# Patient Record
Sex: MALE | Race: BLACK OR AFRICAN AMERICAN | NOT HISPANIC OR LATINO | Employment: OTHER | URBAN - METROPOLITAN AREA
[De-identification: names, ages, dates, MRNs, and addresses within clinical notes are randomized per-mention and may not be internally consistent; named-entity substitution may affect disease eponyms.]

---

## 2024-04-04 ENCOUNTER — OFFICE VISIT (OUTPATIENT)
Dept: UROLOGY | Facility: CLINIC | Age: 69
End: 2024-04-04
Payer: MEDICARE

## 2024-04-04 ENCOUNTER — LAB VISIT (OUTPATIENT)
Dept: LAB | Facility: HOSPITAL | Age: 69
End: 2024-04-04
Attending: UROLOGY
Payer: MEDICARE

## 2024-04-04 VITALS
DIASTOLIC BLOOD PRESSURE: 77 MMHG | SYSTOLIC BLOOD PRESSURE: 144 MMHG | BODY MASS INDEX: 37.09 KG/M2 | RESPIRATION RATE: 18 BRPM | HEIGHT: 72 IN | HEART RATE: 60 BPM | WEIGHT: 273.81 LBS | TEMPERATURE: 98 F

## 2024-04-04 DIAGNOSIS — N13.8 ENLARGED PROSTATE WITH URINARY OBSTRUCTION: ICD-10-CM

## 2024-04-04 DIAGNOSIS — N40.1 ENLARGED PROSTATE WITH URINARY OBSTRUCTION: ICD-10-CM

## 2024-04-04 DIAGNOSIS — R97.20 ELEVATED PSA: Primary | ICD-10-CM

## 2024-04-04 DIAGNOSIS — R97.20 ELEVATED PSA: ICD-10-CM

## 2024-04-04 LAB
PROSTATE SPECIFIC ANTIGEN, TOTAL: 4.5 NG/ML (ref 0–4)
PSA FREE MFR SERPL: 20.89 %
PSA FREE SERPL-MCNC: 0.94 NG/ML (ref 0–1.5)

## 2024-04-04 PROCEDURE — 84154 ASSAY OF PSA FREE: CPT | Performed by: UROLOGY

## 2024-04-04 PROCEDURE — 99204 OFFICE O/P NEW MOD 45 MIN: CPT | Mod: PBBFAC,PN | Performed by: UROLOGY

## 2024-04-04 PROCEDURE — 36415 COLL VENOUS BLD VENIPUNCTURE: CPT | Mod: PN | Performed by: UROLOGY

## 2024-04-04 PROCEDURE — 99999 PR PBB SHADOW E&M-NEW PATIENT-LVL IV: CPT | Mod: PBBFAC,,, | Performed by: UROLOGY

## 2024-04-04 PROCEDURE — 99214 OFFICE O/P EST MOD 30 MIN: CPT | Mod: S$PBB,,, | Performed by: UROLOGY

## 2024-04-04 RX ORDER — LEVOTHYROXINE SODIUM 88 UG/1
88 TABLET ORAL
COMMUNITY
Start: 2024-04-02 | End: 2025-04-02

## 2024-04-04 RX ORDER — METFORMIN HYDROCHLORIDE 500 MG/1
500 TABLET, EXTENDED RELEASE ORAL
COMMUNITY
Start: 2024-01-22

## 2024-04-04 RX ORDER — SIMVASTATIN 20 MG/1
20 TABLET, FILM COATED ORAL
COMMUNITY

## 2024-04-04 RX ORDER — AMLODIPINE BESYLATE 10 MG/1
10 TABLET ORAL
COMMUNITY

## 2024-04-04 RX ORDER — METOPROLOL TARTRATE 50 MG/1
50 TABLET ORAL
COMMUNITY

## 2024-04-04 NOTE — PROGRESS NOTES
Subjective:       Patient ID: Deric Hdez is a 68 y.o. male.    Chief Complaint: Follow-up      History of Present Illness:     Mr Hdez has an elevated PSA that we have been monitoring.  He has never had a prostate biopsy.  He had a prostate MRI on 4-10-23 that did not show evidence of prostate cancer.  Moderate to normal urinary flow.  He feels that he is emptying his bladder.  Nocturia X 1.  No gross hematuria.  He says he is no longer taking tadalafil 5 mg as he is not sexually active due to his wife having a dixon catheter.    Follow-up  Pertinent negatives include no chest pain, chills, fever, nausea, rash or vomiting.        History reviewed. No pertinent past medical history.  History reviewed. No pertinent family history.  Social History     Socioeconomic History    Marital status:    Tobacco Use    Smoking status: Never    Smokeless tobacco: Never   Substance and Sexual Activity    Alcohol use: Never    Drug use: Never    Sexual activity: Not Currently     Partners: Female     Outpatient Encounter Medications as of 4/4/2024   Medication Sig Dispense Refill    amLODIPine (NORVASC) 10 MG tablet Take 10 mg by mouth.      levothyroxine (SYNTHROID) 88 MCG tablet Take 88 mcg by mouth.      LYCOPENE ORAL Take one every day  Indications: Vitamins      metFORMIN (GLUCOPHAGE-XR) 500 MG ER 24hr tablet Take 500 mg by mouth.      metoprolol tartrate (LOPRESSOR) 50 MG tablet Take 50 mg by mouth.      psyllium (METAMUCIL) powder Dissolve and drink as needed as directed  Indications: Appetite Suppression      simvastatin (ZOCOR) 20 MG tablet Take 20 mg by mouth.       No facility-administered encounter medications on file as of 4/4/2024.        Review of Systems   Constitutional:  Negative for chills and fever.   Respiratory:  Negative for shortness of breath.    Cardiovascular:  Negative for chest pain.   Gastrointestinal:  Negative for nausea and vomiting.   Genitourinary:  Negative for hematuria.    Musculoskeletal:  Negative for back pain.   Skin:  Negative for rash.   Neurological:  Negative for dizziness.   Psychiatric/Behavioral:  Negative for agitation.        Objective:     BP (!) 144/77   Pulse 60   Temp 98 °F (36.7 °C) (Oral)   Resp 18   Ht 6' (1.829 m)   Wt 124.2 kg (273 lb 13 oz)   BMI 37.14 kg/m²     Physical Exam  Constitutional:       Appearance: Normal appearance.   Pulmonary:      Effort: Pulmonary effort is normal.   Abdominal:      Palpations: Abdomen is soft.   Genitourinary:     Comments: Deferred  Neurological:      Mental Status: He is alert and oriented to person, place, and time.   Psychiatric:         Mood and Affect: Mood normal.         No visits with results within 1 Day(s) from this visit.   Latest known visit with results is:   No results found for any previous visit.        No results found for this or any previous visit (from the past 8760 hour(s)).         8-15-23  PSA 4.97.  Free % PSA 17.1    5-18-23  PSA 5.68.  Free % PSA 18.3%.    3-6-23  PSA 5.25.  Free % PSA 21.1%.    7-10-23  Urine ExoDx 38.67.    I reviewed all of the above lab results.         4-10-23  Prostate MRI.  BRG.  See report.  No suspicious prostate lesions identified by MRI.  BPH.  PIRADS 2.  Prostate size is 75.5 cc.  There is no lymphadenopathy.    8-5-21  Prostate MRI.  OLOL.  See report.  No evidence of clinically significant tumor.  PIRADS 2.  Prostate size is 72 cc.    I reviewed all of the above imaging results.       Assessment:       1. Elevated PSA    2. Enlarged prostate with urinary obstruction      Plan:     Orders Placed This Encounter    PSA, Total and Free        Assessment:  - Elevated PSA.  - Prostate MRI on 4-10-23 showed no suspicious prostate lesions identified by MRI.  BPH.  PIRADS 2.  Prostate size is 75.5 cc.    - BPH with mild nonbothersome LUTS.  - Family history of bladder cancer in his father.    Plan:  - PSA today.  - RTC in 4 months.

## 2024-04-05 ENCOUNTER — TELEPHONE (OUTPATIENT)
Dept: UROLOGY | Facility: CLINIC | Age: 69
End: 2024-04-05
Payer: MEDICARE

## 2024-04-05 DIAGNOSIS — R97.20 ELEVATED PSA: Primary | ICD-10-CM

## 2024-04-05 NOTE — TELEPHONE ENCOUNTER
I called and scheduled patient lab appointment, no questions .    ----- Message from Renan Harding MD sent at 4/5/2024  8:25 AM CDT -----  I called and spoke with Mr Leos on the phone just now and I let him know that I reviewed his PSA result and his PSA is elevated at 4.5 but has decreased from his PSA of 4.97 in August 2023.  The mutual decisino was made to continue with observation with rechecking his PSA in 4 months (a few days prior to his 4 month appointment).  I placed this future PSA order for 4 months.  No call back is needed at this time.

## 2024-08-06 ENCOUNTER — LAB VISIT (OUTPATIENT)
Dept: LAB | Facility: HOSPITAL | Age: 69
End: 2024-08-06
Attending: UROLOGY
Payer: MEDICARE

## 2024-08-06 DIAGNOSIS — R97.20 ELEVATED PSA: ICD-10-CM

## 2024-08-06 PROCEDURE — 84154 ASSAY OF PSA FREE: CPT | Performed by: UROLOGY

## 2024-08-06 PROCEDURE — 84153 ASSAY OF PSA TOTAL: CPT | Performed by: UROLOGY

## 2024-08-06 PROCEDURE — 36415 COLL VENOUS BLD VENIPUNCTURE: CPT | Mod: PO | Performed by: UROLOGY

## 2024-08-07 LAB
PROSTATE SPECIFIC ANTIGEN, TOTAL: 5.1 NG/ML (ref 0–4)
PSA FREE MFR SERPL: 28.63 %
PSA FREE SERPL-MCNC: 1.46 NG/ML (ref 0–1.5)

## 2024-08-09 ENCOUNTER — OFFICE VISIT (OUTPATIENT)
Facility: CLINIC | Age: 69
End: 2024-08-09
Payer: MEDICARE

## 2024-08-09 VITALS
DIASTOLIC BLOOD PRESSURE: 75 MMHG | RESPIRATION RATE: 16 BRPM | WEIGHT: 273.81 LBS | HEIGHT: 72 IN | HEART RATE: 69 BPM | BODY MASS INDEX: 37.09 KG/M2 | SYSTOLIC BLOOD PRESSURE: 150 MMHG

## 2024-08-09 DIAGNOSIS — R97.20 ELEVATED PSA: Primary | ICD-10-CM

## 2024-08-09 DIAGNOSIS — N40.0 BPH WITHOUT URINARY OBSTRUCTION: ICD-10-CM

## 2024-08-09 LAB
BILIRUB UR QL STRIP: NEGATIVE
GLUCOSE UR QL STRIP: NEGATIVE
KETONES UR QL STRIP: NEGATIVE
LEUKOCYTE ESTERASE UR QL STRIP: NEGATIVE
PH, POC UA: 6
POC BLOOD, URINE: NEGATIVE
POC NITRATES, URINE: NEGATIVE
PROT UR QL STRIP: NEGATIVE
SP GR UR STRIP: 1.01 (ref 1–1.03)
UROBILINOGEN UR STRIP-ACNC: 0.2 (ref 0.3–2.2)

## 2024-08-09 PROCEDURE — 99213 OFFICE O/P EST LOW 20 MIN: CPT | Mod: PBBFAC,PO | Performed by: UROLOGY

## 2024-08-09 PROCEDURE — 99999 PR PBB SHADOW E&M-EST. PATIENT-LVL III: CPT | Mod: PBBFAC,,, | Performed by: UROLOGY

## 2024-10-09 ENCOUNTER — LAB VISIT (OUTPATIENT)
Dept: LAB | Facility: HOSPITAL | Age: 69
End: 2024-10-09
Attending: UROLOGY
Payer: MEDICARE

## 2024-10-09 DIAGNOSIS — R97.20 ELEVATED PSA: ICD-10-CM

## 2024-10-09 LAB
CREAT SERPL-MCNC: 1.3 MG/DL (ref 0.5–1.4)
EST. GFR  (NO RACE VARIABLE): 59.8 ML/MIN/1.73 M^2
PROSTATE SPECIFIC ANTIGEN, TOTAL: 5.3 NG/ML (ref 0–4)
PSA FREE MFR SERPL: 24.15 %
PSA FREE SERPL-MCNC: 1.28 NG/ML (ref 0–1.5)

## 2024-10-09 PROCEDURE — 84153 ASSAY OF PSA TOTAL: CPT | Performed by: UROLOGY

## 2024-10-09 PROCEDURE — 36415 COLL VENOUS BLD VENIPUNCTURE: CPT | Mod: PO | Performed by: UROLOGY

## 2024-10-09 PROCEDURE — 84154 ASSAY OF PSA FREE: CPT | Performed by: UROLOGY

## 2024-10-09 PROCEDURE — 82565 ASSAY OF CREATININE: CPT | Performed by: UROLOGY

## 2024-10-11 DIAGNOSIS — R97.20 ELEVATED PSA: Primary | ICD-10-CM

## 2024-10-11 DIAGNOSIS — N18.31 CHRONIC KIDNEY DISEASE (CKD) STAGE G3A/A1, MODERATELY DECREASED GLOMERULAR FILTRATION RATE (GFR) BETWEEN 45-59 ML/MIN/1.73 SQUARE METER AND ALBUMINURIA CREATININE RATIO LESS THAN 30 MG/G: ICD-10-CM

## 2024-11-15 RX ORDER — TADALAFIL 20 MG/1
20 TABLET ORAL SEE ADMIN INSTRUCTIONS
Qty: 30 TABLET | Refills: 6 | Status: SHIPPED | OUTPATIENT
Start: 2024-11-15 | End: 2025-11-15

## 2025-01-07 ENCOUNTER — LAB VISIT (OUTPATIENT)
Dept: LAB | Facility: HOSPITAL | Age: 70
End: 2025-01-07
Attending: UROLOGY
Payer: MEDICARE

## 2025-01-07 DIAGNOSIS — N18.31 CHRONIC KIDNEY DISEASE (CKD) STAGE G3A/A1, MODERATELY DECREASED GLOMERULAR FILTRATION RATE (GFR) BETWEEN 45-59 ML/MIN/1.73 SQUARE METER AND ALBUMINURIA CREATININE RATIO LESS THAN 30 MG/G: ICD-10-CM

## 2025-01-07 DIAGNOSIS — R97.20 ELEVATED PSA: ICD-10-CM

## 2025-01-07 LAB
CREAT SERPL-MCNC: 1.1 MG/DL (ref 0.5–1.4)
EST. GFR  (NO RACE VARIABLE): >60 ML/MIN/1.73 M^2
PROSTATE SPECIFIC ANTIGEN, TOTAL: 4.8 NG/ML (ref 0–4)
PSA FREE MFR SERPL: 22.5 %
PSA FREE SERPL-MCNC: 1.08 NG/ML (ref 0–1.5)

## 2025-01-07 PROCEDURE — 82565 ASSAY OF CREATININE: CPT | Performed by: UROLOGY

## 2025-01-07 PROCEDURE — 36415 COLL VENOUS BLD VENIPUNCTURE: CPT | Mod: PO | Performed by: UROLOGY

## 2025-01-07 PROCEDURE — 84153 ASSAY OF PSA TOTAL: CPT | Performed by: UROLOGY

## 2025-01-10 ENCOUNTER — OFFICE VISIT (OUTPATIENT)
Facility: CLINIC | Age: 70
End: 2025-01-10
Payer: MEDICARE

## 2025-01-10 VITALS
BODY MASS INDEX: 37.14 KG/M2 | HEART RATE: 67 BPM | WEIGHT: 273.81 LBS | SYSTOLIC BLOOD PRESSURE: 157 MMHG | RESPIRATION RATE: 16 BRPM | DIASTOLIC BLOOD PRESSURE: 73 MMHG

## 2025-01-10 DIAGNOSIS — R31.29 OTHER MICROSCOPIC HEMATURIA: ICD-10-CM

## 2025-01-10 DIAGNOSIS — N40.1 BENIGN LOCALIZED PROSTATIC HYPERPLASIA WITH LOWER URINARY TRACT SYMPTOMS (LUTS): ICD-10-CM

## 2025-01-10 DIAGNOSIS — R35.1 NOCTURIA: ICD-10-CM

## 2025-01-10 DIAGNOSIS — R97.20 ELEVATED PSA: Primary | ICD-10-CM

## 2025-01-10 PROCEDURE — 99999 PR PBB SHADOW E&M-EST. PATIENT-LVL III: CPT | Mod: PBBFAC,,, | Performed by: UROLOGY

## 2025-01-10 PROCEDURE — 99213 OFFICE O/P EST LOW 20 MIN: CPT | Mod: PBBFAC,PO | Performed by: UROLOGY

## 2025-01-10 NOTE — PROGRESS NOTES
Subjective:       Patient ID: Deric Hdez is a 69 y.o. male.    Chief Complaint: Follow-up      History of Present Illness:     Mr Hdez is here today with his wife.  He has an elevated PSA that has decreased with observation.  He underwent a prostate MRI on 4-10-23 that showed no suspicious prostate lesions identified by MRI.  BPH.  PIRADS 2.  Prostate size is 75.5 cc.      He has a trace amount of microscopic hematuria today on 1-10-25.  No gross hematuria.  No dysuria. He is not a smoker and he has no smoking history.  Denies taking a blood thinner.    BPH with mild nonbothersome LUTS.  Moderate urinary flow at night.  Nocturia X 1-2.  No straining to void.  He has ED and tadalafil 20 mg works for his erections.           Past Medical History:   Diagnosis Date    Diabetes mellitus     Hypertension     Liver disease      Family History   Problem Relation Name Age of Onset    No Known Problems Father      No Known Problems Mother      No Known Problems Maternal Aunt      No Known Problems Maternal Uncle      No Known Problems Paternal Aunt      No Known Problems Paternal Uncle      No Known Problems Paternal Grandmother      No Known Problems Paternal Grandfather      No Known Problems Maternal Grandmother      No Known Problems Maternal Grandfather       Social History     Socioeconomic History    Marital status:    Tobacco Use    Smoking status: Never    Smokeless tobacco: Never   Substance and Sexual Activity    Alcohol use: Never    Drug use: Never    Sexual activity: Not Currently     Partners: Female     Social Drivers of Health     Physical Activity: Low Risk  (9/26/2023)    Received from [facility]    PCARE Exercise SDOH     Exercise: Aerobic     Outpatient Encounter Medications as of 1/10/2025   Medication Sig Dispense Refill    amLODIPine (NORVASC) 10 MG tablet Take 10 mg by mouth.      levothyroxine (SYNTHROID) 88 MCG tablet Take 88 mcg by mouth.       LYCOPENE ORAL Take one every day  Indications: Vitamins      metFORMIN (GLUCOPHAGE-XR) 500 MG ER 24hr tablet Take 500 mg by mouth.      metoprolol tartrate (LOPRESSOR) 50 MG tablet Take 50 mg by mouth.      psyllium (METAMUCIL) powder Dissolve and drink as needed as directed  Indications: Appetite Suppression      simvastatin (ZOCOR) 20 MG tablet Take 20 mg by mouth.      tadalafiL (CIALIS) 20 MG Tab Take 1 tablet (20 mg total) by mouth As instructed (Take either 1/2 tablet or 1 tablet by mouth as needed 2 to 12 hours prior to sexual activity). 30 tablet 6     No facility-administered encounter medications on file as of 1/10/2025.        Review of Systems   Constitutional:  Negative for chills and fever.   Respiratory:  Negative for shortness of breath.    Cardiovascular:  Negative for chest pain.   Gastrointestinal:  Negative for nausea and vomiting.   Genitourinary:  Negative for dysuria.   Musculoskeletal:  Negative for back pain.   Skin:  Negative for rash.   Neurological:  Negative for dizziness.   Psychiatric/Behavioral:  Negative for agitation.        Objective:     BP (!) 157/73 (Patient Position: Sitting)   Pulse 67   Resp 16   Wt 124.2 kg (273 lb 13 oz)   BMI 37.14 kg/m²     Physical Exam  Constitutional:       Appearance: Normal appearance.   Pulmonary:      Effort: Pulmonary effort is normal.   Abdominal:      Palpations: Abdomen is soft.   Neurological:      Mental Status: He is alert and oriented to person, place, and time.   Psychiatric:         Mood and Affect: Mood normal.         No visits with results within 1 Day(s) from this visit.   Latest known visit with results is:   Lab Visit on 01/07/2025   Component Date Value Ref Range Status    PSA Total 01/07/2025 4.8 (H)  0.00 - 4.00 ng/mL Final    Comment: The testing method is a chemiluminescent microparticle immunoassay   manufactured by [employer] and performed on the Klique   or   RESPACE system. Values obtained with  different assay manufacturers   for   methods may be different and cannot be used interchangeably.  PSA Expected levels:  Hormonal Therapy: <0.05 ng/ml  Prostatectomy: <0.01 ng/ml  Radiation Therapy: <1.00 ng/ml      PSA, Free 01/07/2025 1.08  0.00 - 1.50 ng/mL Final    Comment: The testing method is a chemiluminescent microparticle immunoassay   manufactured by [employer] and performed on the Avaz   or   FitBionic system. Values obtained with different assay manufacturers   for   methods may be different and cannot be used interchangeably.      PSA, Free % 01/07/2025 22.50  Not established % Final    Creatinine 01/07/2025 1.1  0.5 - 1.4 mg/dL Final    eGFR 01/07/2025 >60.0  >60 mL/min/1.73 m^2 Final        No results found for this or any previous visit (from the past 8760 hours).     Assessment:       1. Elevated PSA    2. Other microscopic hematuria    3. Benign localized prostatic hyperplasia with lower urinary tract symptoms (LUTS)    4. Nocturia        Plan:     Orders Placed This Encounter    PSA, Total and Free          4-10-23  Prostate MRI.  BRG.  See report.  No suspicious prostate lesions identified by MRI.  BPH.  PIRADS 2.  Prostate size is 75.5 cc.  There is no lymphadenopathy.     8-5-21  Prostate MRI.  OLOL.  See report.  No evidence of clinically significant tumor.  PIRADS 2.  Prostate size is 72 cc.     I reviewed all of the above imaging results.            1-7-25  PSA 4.8.  Free % PSA 22.50.    10-9-24  PSA 5.3.  Free % PSA 24.15.     8-6-24  PSA 5.1.  Free % PSA 28.63.     4-4-24  PSA 4.5.  Free % PSA 20.89.     8-15-23  PSA 4.97.  Free % PSA 17.1     5-18-23  PSA 5.68.  Free % PSA 18.3%.     3-6-23  PSA 5.25.  Free % PSA 21.1%.    1-7-25  Cr 1.1.  GFR >60.     7-10-23  Urine ExoDx 38.67.     I reviewed all of the above lab results.            Assessment:  - Elevated PSA that has decreased with observation.  - Prostate MRI on 4-10-23 showed no suspicious prostate lesions  identified by MRI.  BPH.  PIRADS 2.  Prostate size is 75.5 cc.    - Trace amount of microscopic hematuria today on 1-10-25.  He is not a smoker and he has no smoking history.  Denies taking a blood thinner.  - BPH with mild nonbothersome LUTS.  - Nocturia.  - ED.  Tadalafil 20 mg works for his erections.  He gets it from [facility].  - Family history of bladder cancer in his father.     Plan  - I discussed options with the patient and his wife today regarding his elevated PSA and the mutual decision was made to continue with observation with a repeat PSA in 4 months.  - The mutual decision was made to hold off on starting him on a prostate medication at this time.  - Observation of his trace amount of MSH at this time.  Will plan to recheck a UA at his 4 month appointment.  - I discussed dietary modifications with him today and I recommended he drink mostly water during the day.  - I recommended he limit his fluid intake at night to small amounts of water and to void just prior to bedtime.   - PSA in 4 months a several days prior to a 4 month appointment.

## 2025-05-05 ENCOUNTER — LAB VISIT (OUTPATIENT)
Dept: LAB | Facility: HOSPITAL | Age: 70
End: 2025-05-05
Attending: UROLOGY
Payer: MEDICARE

## 2025-05-05 DIAGNOSIS — R97.20 ELEVATED PSA: ICD-10-CM

## 2025-05-05 LAB
PSA FREE MFR SERPL: 20.65 %
PSA FREE SERPL-MCNC: 1.47 NG/ML
PSA SERPL-MCNC: 7.12 NG/ML

## 2025-05-05 PROCEDURE — 36415 COLL VENOUS BLD VENIPUNCTURE: CPT | Mod: PO

## 2025-05-05 PROCEDURE — 84153 ASSAY OF PSA TOTAL: CPT

## 2025-05-09 ENCOUNTER — OFFICE VISIT (OUTPATIENT)
Facility: CLINIC | Age: 70
End: 2025-05-09
Payer: MEDICARE

## 2025-05-09 VITALS
RESPIRATION RATE: 14 BRPM | BODY MASS INDEX: 36.57 KG/M2 | HEART RATE: 79 BPM | DIASTOLIC BLOOD PRESSURE: 78 MMHG | HEIGHT: 72 IN | SYSTOLIC BLOOD PRESSURE: 163 MMHG | WEIGHT: 270 LBS

## 2025-05-09 DIAGNOSIS — N52.8 OTHER MALE ERECTILE DYSFUNCTION: ICD-10-CM

## 2025-05-09 DIAGNOSIS — R97.20 ELEVATED PSA: Primary | ICD-10-CM

## 2025-05-09 DIAGNOSIS — N40.0 BPH WITHOUT URINARY OBSTRUCTION: ICD-10-CM

## 2025-05-09 DIAGNOSIS — R35.1 NOCTURIA: ICD-10-CM

## 2025-05-09 PROCEDURE — 99999 PR PBB SHADOW E&M-EST. PATIENT-LVL III: CPT | Mod: PBBFAC,,, | Performed by: UROLOGY

## 2025-05-09 RX ORDER — LOSARTAN POTASSIUM 50 MG/1
50 TABLET ORAL
COMMUNITY
Start: 2025-04-16 | End: 2025-10-13

## 2025-05-09 RX ORDER — SILDENAFIL 100 MG/1
100 TABLET, FILM COATED ORAL SEE ADMIN INSTRUCTIONS
Qty: 10 TABLET | Refills: 6 | Status: SHIPPED | OUTPATIENT
Start: 2025-05-09 | End: 2026-05-09

## 2025-05-09 NOTE — PROGRESS NOTES
Subjective:       Patient ID: Deric Hdez is a 69 y.o. male.    Chief Complaint: Elevated PSA      History of Present Illness:     Mr Hdez has an elevated PSA that has increased with observation.  His PSA was 4.8 on 1-7-25 and it has increased to 7.12 on 5-5-25.  He has not had a prostate biopsy.  No known family history of prostate cancer.  He has a family history of bladder cancer in his father.  He underwent a prostate MRI on 4-10-23 that showed no suspicious prostate lesions identified by MRI.  BPH.  PIRADS 2.  Prostate size is 75.5 cc.      He has BPH without obstructive LUTS.  Normal urinary flow.  He feels that he is emptying his bladder.  Nocturia X 1-2.  UA today on 5-9-25 is normal.  He has ED and tadalafil 20 mg occasionally works for his erections.           Past Medical History:   Diagnosis Date    Diabetes mellitus     Hypertension     Liver disease      Family History   Problem Relation Name Age of Onset    No Known Problems Father      No Known Problems Mother      No Known Problems Maternal Aunt      No Known Problems Maternal Uncle      No Known Problems Paternal Aunt      No Known Problems Paternal Uncle      No Known Problems Paternal Grandmother      No Known Problems Paternal Grandfather      No Known Problems Maternal Grandmother      No Known Problems Maternal Grandfather       Social History[1]  Encounter Medications[2]     Review of Systems   Constitutional:  Negative for chills and fever.   Respiratory:  Negative for shortness of breath.    Cardiovascular:  Negative for chest pain.   Gastrointestinal:  Negative for nausea and vomiting.   Genitourinary:  Negative for difficulty urinating, dysuria and hematuria.   Musculoskeletal:  Negative for back pain.   Skin:  Negative for rash.   Neurological:  Negative for dizziness.   Psychiatric/Behavioral:  Negative for agitation.        Objective:     BP (!) 163/78 (BP Location: Right arm, Patient Position: Sitting)   Pulse 79   Resp 14    Ht 6' (1.829 m)   Wt 122.5 kg (270 lb)   BMI 36.62 kg/m²     Physical Exam  Constitutional:       Appearance: Normal appearance.   Pulmonary:      Effort: Pulmonary effort is normal.   Abdominal:      Palpations: Abdomen is soft.   Neurological:      Mental Status: He is alert and oriented to person, place, and time.   Psychiatric:         Mood and Affect: Mood normal.         No visits with results within 1 Day(s) from this visit.   Latest known visit with results is:   Lab Visit on 05/05/2025   Component Date Value Ref Range Status    Prostate Specific Antigen 05/05/2025 7.12 (H)  <=4.00 ng/mL Final    PSA Expected levels:  Hormonal therapy: < 0.05 ng/mL   Prostatectomy: < 0.01 ng/mL   Radiation therapy: < 1.00 ng/mL      Prostate Specific Antigen Free 05/05/2025 1.47  <=1.50 ng/mL Final    PSA % Free 05/05/2025 20.65  Not established % Final        No results found for this or any previous visit (from the past 8760 hours).     Assessment:       1. Elevated PSA    2. Nocturia    3. BPH without urinary obstruction    4. Other male erectile dysfunction        Plan:     Orders Placed This Encounter    MRI Prostate W W/O Contrast    Creatinine, Serum    sildenafiL (VIAGRA) 100 MG tablet          4-10-23  Prostate MRI.  BRG.  See report.  No suspicious prostate lesions identified by MRI.  BPH.  PIRADS 2.  Prostate size is 75.5 cc.  There is no lymphadenopathy.     8-5-21  Prostate MRI.  OLOL.  See report.  No evidence of clinically significant tumor.  PIRADS 2.  Prostate size is 72 cc.     I reviewed all of the above imaging results.           5-5-25  PSA 7.12.  Free % PSA 20.65.     1-7-25  PSA 4.8.  Free % PSA 22.50.     10-9-24  PSA 5.3.  Free % PSA 24.15.     8-6-24  PSA 5.1.  Free % PSA 28.63.     4-4-24  PSA 4.5.  Free % PSA 20.89.     8-15-23  PSA 4.97.  Free % PSA 17.1     5-18-23  PSA 5.68.  Free % PSA 18.3%.     3-6-23  PSA 5.25.  Free % PSA 21.1%.     1-7-25  Cr 1.1.  GFR >60.     7-10-23  Urine ExoDx  38.67.     I reviewed all of the above lab results.            Assessment:  - Elevated PSA that has increased with observation.  - Prostate MRI on 4-10-23 showed no suspicious prostate lesions identified by MRI.  BPH.  PIRADS 2.  Prostate size is 75.5 cc.    - Nocturia.  - BPH without obstructive LUTS.  - Trace amount of microscopic hematuria n 1-10-25.  No MH today on 5-9-25.  He is not a smoker and he has no smoking history.  He is not taking a blood thinner.  - ED.  Tadalafil 20 mg occasionally works for his erections.    - Family history of bladder cancer in his father.     Plan  - I discussed options with the patient and his wife today regarding his elevated and rising PSA and the mutual decision was made to proceed with a prostate MRI.  - Creatinine today.  - I discussed options with the patient today regarding his ED and he would like to try sildenafil.  - I prescribed him sildenafil 100 mg, disp #10, 6 refills to [facility] pharmacy today on 5-9-25 with detailed verbal instructions.  - I discussed dietary modifications with him today and I recommended he drink mostly water during the day.  - I recommended he limit his fluid intake at night to small amounts of water and to void just prior to bedtime.   - Prostate MRI followed by an office visit.                          [1]   Social History  Socioeconomic History    Marital status:    Tobacco Use    Smoking status: Never    Smokeless tobacco: Never   Substance and Sexual Activity    Alcohol use: Never    Drug use: Never    Sexual activity: Not Currently     Partners: Female     Social Drivers of Health     Physical Activity: Low Risk  (9/26/2023)    Received from [facility] Exercise SDOH     Exercise: Aerobic   [2]   Outpatient Encounter Medications as of 5/9/2025   Medication Sig Dispense Refill    amLODIPine (NORVASC) 10 MG tablet Take 10 mg by mouth.      losartan (COZAAR) 50 MG tablet Take 50 mg by mouth.      LYCOPENE ORAL Take  one every day  Indications: Vitamins      metFORMIN (GLUCOPHAGE-XR) 500 MG ER 24hr tablet Take 500 mg by mouth.      psyllium (METAMUCIL) powder Dissolve and drink as needed as directed  Indications: Appetite Suppression      simvastatin (ZOCOR) 20 MG tablet Take 20 mg by mouth.      tadalafiL (CIALIS) 20 MG Tab Take 1 tablet (20 mg total) by mouth As instructed (Take either 1/2 tablet or 1 tablet by mouth as needed 2 to 12 hours prior to sexual activity). 30 tablet 6    levothyroxine (SYNTHROID) 88 MCG tablet Take 88 mcg by mouth.      metoprolol tartrate (LOPRESSOR) 50 MG tablet Take 50 mg by mouth. (Patient not taking: Reported on 5/9/2025)      sildenafiL (VIAGRA) 100 MG tablet Take 1 tablet (100 mg total) by mouth As instructed for Erectile Dysfunction (Take 1 by mouth as needed 1 hour prior to sexual activity on an empty stomach). 10 tablet 6     No facility-administered encounter medications on file as of 5/9/2025.

## 2025-06-09 DIAGNOSIS — R97.20 ELEVATED PSA: Primary | ICD-10-CM

## 2025-06-13 ENCOUNTER — HOSPITAL ENCOUNTER (OUTPATIENT)
Dept: RADIOLOGY | Facility: HOSPITAL | Age: 70
Discharge: HOME | End: 2025-06-13
Attending: UROLOGY
Payer: MEDICARE

## 2025-06-13 DIAGNOSIS — R97.20 ELEVATED PSA: ICD-10-CM

## 2025-06-13 PROCEDURE — 72197 MRI PELVIS W/O & W/DYE: CPT | Mod: 26,,, | Performed by: RADIOLOGY

## 2025-06-13 PROCEDURE — 72197 MRI PELVIS W/O & W/DYE: CPT | Mod: TC

## 2025-06-13 PROCEDURE — 25500020 PHARM REV CODE 255: Performed by: UROLOGY

## 2025-06-13 PROCEDURE — A9585 GADOBUTROL INJECTION: HCPCS | Performed by: UROLOGY

## 2025-06-13 RX ORDER — GADOBUTROL 604.72 MG/ML
10 INJECTION INTRAVENOUS
Status: COMPLETED | OUTPATIENT
Start: 2025-06-13 | End: 2025-06-13

## 2025-06-13 RX ADMIN — GADOBUTROL 10 ML: 604.72 INJECTION INTRAVENOUS at 01:06

## 2025-06-16 ENCOUNTER — TELEPHONE (OUTPATIENT)
Dept: UROLOGY | Facility: CLINIC | Age: 70
End: 2025-06-16
Payer: MEDICARE

## 2025-06-16 ENCOUNTER — PATIENT MESSAGE (OUTPATIENT)
Dept: UROLOGY | Facility: CLINIC | Age: 70
End: 2025-06-16
Payer: MEDICARE

## 2025-06-16 NOTE — TELEPHONE ENCOUNTER
.Outgoing call attempted to notify patient regarding below but no answer, left voice message with contact information letting patient know he can contact us at his earliest convenience for assistance with scheduling an appointment.    ----- Message from Renan Harding MD sent at 6/14/2025 12:53 PM CDT -----  Mr Hdez has an abnormal prostate MRI and it does not look like he was scheduled a follow up appointment for discussion.  Please call the patient to schedule him an appointment to see me on either 6-25-25, 6-26-25, 6-27-25, or 6-30-25 for discussion of his prostate MRI result.  Thank you!

## 2025-06-16 NOTE — TELEPHONE ENCOUNTER
Attempted to call pt to inform him that an appt has been scheduled for him to review his abnormal imaging.  No answer.  Left vm.

## 2025-06-25 ENCOUNTER — OFFICE VISIT (OUTPATIENT)
Dept: UROLOGY | Facility: CLINIC | Age: 70
End: 2025-06-25
Payer: MEDICARE

## 2025-06-25 VITALS
BODY MASS INDEX: 36.43 KG/M2 | WEIGHT: 268.94 LBS | HEART RATE: 79 BPM | SYSTOLIC BLOOD PRESSURE: 147 MMHG | HEIGHT: 72 IN | DIASTOLIC BLOOD PRESSURE: 77 MMHG

## 2025-06-25 DIAGNOSIS — D40.0 NEOPLASM OF UNCERTAIN BEHAVIOR OF PROSTATE: ICD-10-CM

## 2025-06-25 DIAGNOSIS — Z01.818 PRE-OP EXAM: ICD-10-CM

## 2025-06-25 DIAGNOSIS — R97.20 ELEVATED PSA: Primary | ICD-10-CM

## 2025-06-25 LAB
BILIRUBIN, UA POC OHS: NEGATIVE
BLOOD, UA POC OHS: NEGATIVE
CLARITY, UA POC OHS: CLEAR
COLOR, UA POC OHS: YELLOW
GLUCOSE, UA POC OHS: NEGATIVE
KETONES, UA POC OHS: NEGATIVE
LEUKOCYTES, UA POC OHS: NEGATIVE
NITRITE, UA POC OHS: NEGATIVE
PH, UA POC OHS: 6
PROTEIN, UA POC OHS: NEGATIVE
SPECIFIC GRAVITY, UA POC OHS: 1.01
UROBILINOGEN, UA POC OHS: 0.2

## 2025-06-25 PROCEDURE — 99215 OFFICE O/P EST HI 40 MIN: CPT | Mod: PBBFAC | Performed by: UROLOGY

## 2025-06-25 PROCEDURE — 99999 PR PBB SHADOW E&M-EST. PATIENT-LVL V: CPT | Mod: PBBFAC,,, | Performed by: UROLOGY

## 2025-06-25 PROCEDURE — 99999PBSHW POCT URINALYSIS(INSTRUMENT): Mod: PBBFAC,,,

## 2025-06-25 PROCEDURE — 81003 URINALYSIS AUTO W/O SCOPE: CPT | Mod: PBBFAC | Performed by: UROLOGY

## 2025-06-25 PROCEDURE — 99215 OFFICE O/P EST HI 40 MIN: CPT | Mod: S$PBB,,, | Performed by: UROLOGY

## 2025-06-25 NOTE — H&P (VIEW-ONLY)
Subjective:       Patient ID: Deric Hdez is a 69 y.o. male.    Chief Complaint: Follow-up      History of Present Illness:     Mr Hdez is here today with his wife.  He has an elevated PSA that has increased with observation.  He has never had a prostate biopsy.  No significant LUTS.  He underwent a prostate MRI on 6-13-25 that showed a 1.1 cm right peripheral zone apex lesion.  Recommend targeted biopsy.  PIRADS 4.  Prostate volume of approximately 89 cc.  No evidence of lymphadenopathy.          Past Medical History:   Diagnosis Date    Diabetes mellitus     Hypertension     Liver disease      Family History   Problem Relation Name Age of Onset    No Known Problems Father      No Known Problems Mother      No Known Problems Maternal Aunt      No Known Problems Maternal Uncle      No Known Problems Paternal Aunt      No Known Problems Paternal Uncle      No Known Problems Paternal Grandmother      No Known Problems Paternal Grandfather      No Known Problems Maternal Grandmother      No Known Problems Maternal Grandfather       Social History[1]  Encounter Medications[2]     Review of Systems   Constitutional:  Negative for chills and fever.   Respiratory:  Negative for shortness of breath.    Cardiovascular:  Negative for chest pain.   Gastrointestinal:  Negative for nausea and vomiting.   Genitourinary:  Negative for difficulty urinating, dysuria and hematuria.   Musculoskeletal:  Negative for back pain.   Skin:  Negative for rash.   Neurological:  Negative for dizziness.   Psychiatric/Behavioral:  Negative for agitation.        Objective:     BP (!) 147/77   Pulse 79   Ht 6' (1.829 m)   Wt 122 kg (268 lb 15.4 oz)   BMI 36.48 kg/m²     Physical Exam  Constitutional:       Appearance: Normal appearance.   Pulmonary:      Effort: Pulmonary effort is normal.   Abdominal:      Palpations: Abdomen is soft.   Neurological:      Mental Status: He is alert and oriented to person, place, and time.    Psychiatric:         Mood and Affect: Mood normal.         Office Visit on 06/25/2025   Component Date Value Ref Range Status    Color, POC UA 06/25/2025 Yellow  Yellow, Straw, Colorless Final    Clarity, POC UA 06/25/2025 Clear  Clear Final    Glucose, POC UA 06/25/2025 Negative  Negative Final    Bilirubin, POC UA 06/25/2025 Negative  Negative Final    Ketones, POC UA 06/25/2025 Negative  Negative Final    Spec Grav POC UA 06/25/2025 1.015  1.005 - 1.030 Final    Blood, POC UA 06/25/2025 Negative  Negative Final    pH, POC UA 06/25/2025 6.0  5.0 - 8.0 Final    Protein, POC UA 06/25/2025 Negative  Negative Final    Urobilinogen, POC UA 06/25/2025 0.2  <=1.0 Final    Nitrite, POC UA 06/25/2025 Negative  Negative Final    WBC, POC UA 06/25/2025 Negative  Negative Final        No results found for this or any previous visit (from the past 8760 hours).     Assessment:       1. Elevated PSA    2. Neoplasm of uncertain behavior of prostate    3. Pre-op exam        Plan:     Orders Placed This Encounter    X-Ray Chest PA And Lateral    CBC Auto Differential    Comprehensive Metabolic Panel    Ambulatory referral/consult to Pre-Admit    Diet NPO    POCT Urinalysis(Instrument)    IN OFFICE EKG 12-LEAD (to [place])    Case Request Operating Room: BIOPSY,PROSTATE,WITH MRI GUIDANCE        6-13-25  Prostate MRI.  See report.  1.1 cm right peripheral zone apex lesion.  Recommend targeted biopsy.  PIRADS 4.  Prostate volume of approximately 89 cc.  No evidence of lymphadenopathy.      4-10-23  Prostate MRI.  BRG.  See report.  No suspicious prostate lesions identified by MRI.  BPH.  PIRADS 2.  Prostate size is 75.5 cc.  There is no lymphadenopathy.     8-5-21  Prostate MRI.  OLOL.  See report.  No evidence of clinically significant tumor.  PIRADS 2.  Prostate size is 72 cc.     I reviewed all of the above imaging results.            5-5-25  PSA 7.12.  Free % PSA 20.65.     1-7-25  PSA 4.8.  Free % PSA 22.50.     10-9-24  PSA 5.3.   Free % PSA 24.15.     8-6-24  PSA 5.1.  Free % PSA 28.63.     4-4-24  PSA 4.5.  Free % PSA 20.89.     8-15-23  PSA 4.97.  Free % PSA 17.1     5-18-23  PSA 5.68.  Free % PSA 18.3%.     3-6-23  PSA 5.25.  Free % PSA 21.1%.    6-13-25  Cr 1.2.  GFR >60.     1-7-25  Cr 1.1.  GFR >60.    4-9-25  HgbA1c 8     7-10-23  Urine ExoDx 38.67.     I reviewed all of the above lab results.            Assessment:  - Elevated PSA that has increased with observation.  - Prostate MRI on 6-13-25 showed a 1.1 cm right peripheral zone apex lesion.  Recommend targeted biopsy.  PIRADS 4.  Prostate volume of approximately 89 cc.  No evidence of lymphadenopathy.   - Prostate MRI on 4-10-23 showed no suspicious prostate lesions identified by MRI.  BPH.  PIRADS 2.  Prostate size is 75.5 cc.    - Nocturia.  - BPH without obstructive LUTS.  - Trace amount of microscopic hematuria on 1-10-25.  No MH on 5-9-25 or today on 6-25-25.  He is not a smoker and he has no smoking history.  He is not taking a blood thinner.  - ED.  Tadalafil 20 mg occasionally works for his erections.    - DM.  - Family history of bladder cancer in his father.     Plan  - I had a long discussion with the patient and his wife today regarding his abnormal prostate MRI result.  I discussed options with the patient and his wife today and the mutual decision was made to proceed with a MRI ultrasound fusion prostate biopsy.  The patient would like to have this procedure done in operating room with Anesthesia present.  Risks and benefits of the surgery were explained to the patient including the risk of a significant infection and the patient expressed understanding.  All questions were answered by me to the patient's apparent understanding and to the patient's apparent satisfaction.  He was given bowel prep instructions today.  Surgery consent was signed today by the patient.    - I prescribed him sildenafil 100 mg, disp #10, 6 refills to [facility] pharmacy on 5-9-25 with detailed  verbal instructions.  - I discussed dietary modifications with him today and I recommended he drink mostly water during the day.  - I recommended he limit his fluid intake at night to small amounts of water and to void just prior to bedtime.                          [1]   Social History  Socioeconomic History    Marital status:    Tobacco Use    Smoking status: Never    Smokeless tobacco: Never   Substance and Sexual Activity    Alcohol use: Never    Drug use: Never    Sexual activity: Not Currently     Partners: Female     Social Drivers of Health     Financial Resource Strain: Low Risk  (5/28/2025)    Received from [facility]    Overall Financial Resource Strain (CARDIA)     Difficulty of Paying Living Expenses: Not very hard   Food Insecurity: No Food Insecurity (5/28/2025)    Received from [facility]    Hunger Vital Sign     Worried About Running Out of Food in the Last Year: Never true     Ran Out of Food in the Last Year: Never true   Transportation Needs: No Transportation Needs (5/28/2025)    Received from [facility]    PRAPARE - Transportation     Lack of Transportation (Medical): No     Lack of Transportation (Non-Medical): No   Physical Activity: Insufficiently Active (5/28/2025)    Received from [facility]    Exercise Vital Sign     Days of Exercise per Week: 1 day     Minutes of Exercise per Session: 30 min   Stress: No Stress Concern Present (5/28/2025)    Received from [facility]    [employer] - Occupational Stress Questionnaire     Feeling of Stress : Not at all   Housing Stability: Low Risk  (5/28/2025)    Received from [facility]    Housing Stability Vital Sign     Unable to Pay for Housing in the Last Year: No     Number of Times Moved in the Last Year: 0     Homeless in the Last Year: No   [2]   Outpatient  Encounter Medications as of 6/25/2025   Medication Sig Dispense Refill    amLODIPine (NORVASC) 10 MG tablet Take 10 mg by mouth.      losartan (COZAAR) 50 MG tablet Take 50 mg by mouth.      LYCOPENE ORAL Take one every day  Indications: Vitamins      metFORMIN (GLUCOPHAGE-XR) 500 MG ER 24hr tablet Take 500 mg by mouth.      metoprolol tartrate (LOPRESSOR) 50 MG tablet Take 50 mg by mouth.      psyllium (METAMUCIL) powder Dissolve and drink as needed as directed  Indications: Appetite Suppression      sildenafiL (VIAGRA) 100 MG tablet Take 1 tablet (100 mg total) by mouth As instructed for Erectile Dysfunction (Take 1 by mouth as needed 1 hour prior to sexual activity on an empty stomach). 10 tablet 6    simvastatin (ZOCOR) 20 MG tablet Take 20 mg by mouth.      tadalafiL (CIALIS) 20 MG Tab Take 1 tablet (20 mg total) by mouth As instructed (Take either 1/2 tablet or 1 tablet by mouth as needed 2 to 12 hours prior to sexual activity). 30 tablet 6    levothyroxine (SYNTHROID) 88 MCG tablet Take 88 mcg by mouth.       No facility-administered encounter medications on file as of 6/25/2025.

## 2025-06-25 NOTE — PROGRESS NOTES
Subjective:       Patient ID: Deric Hdez is a 69 y.o. male.    Chief Complaint: Follow-up      History of Present Illness:     Mr Hdez is here today with his wife.  He has an elevated PSA that has increased with observation.  He has never had a prostate biopsy.  No significant LUTS.  He underwent a prostate MRI on 6-13-25 that showed a 1.1 cm right peripheral zone apex lesion.  Recommend targeted biopsy.  PIRADS 4.  Prostate volume of approximately 89 cc.  No evidence of lymphadenopathy.          Past Medical History:   Diagnosis Date    Diabetes mellitus     Hypertension     Liver disease      Family History   Problem Relation Name Age of Onset    No Known Problems Father      No Known Problems Mother      No Known Problems Maternal Aunt      No Known Problems Maternal Uncle      No Known Problems Paternal Aunt      No Known Problems Paternal Uncle      No Known Problems Paternal Grandmother      No Known Problems Paternal Grandfather      No Known Problems Maternal Grandmother      No Known Problems Maternal Grandfather       Social History[1]  Encounter Medications[2]     Review of Systems   Constitutional:  Negative for chills and fever.   Respiratory:  Negative for shortness of breath.    Cardiovascular:  Negative for chest pain.   Gastrointestinal:  Negative for nausea and vomiting.   Genitourinary:  Negative for difficulty urinating, dysuria and hematuria.   Musculoskeletal:  Negative for back pain.   Skin:  Negative for rash.   Neurological:  Negative for dizziness.   Psychiatric/Behavioral:  Negative for agitation.        Objective:     BP (!) 147/77   Pulse 79   Ht 6' (1.829 m)   Wt 122 kg (268 lb 15.4 oz)   BMI 36.48 kg/m²     Physical Exam  Constitutional:       Appearance: Normal appearance.   Pulmonary:      Effort: Pulmonary effort is normal.   Abdominal:      Palpations: Abdomen is soft.   Neurological:      Mental Status: He is alert and oriented to person, place, and time.    Psychiatric:         Mood and Affect: Mood normal.         Office Visit on 06/25/2025   Component Date Value Ref Range Status    Color, POC UA 06/25/2025 Yellow  Yellow, Straw, Colorless Final    Clarity, POC UA 06/25/2025 Clear  Clear Final    Glucose, POC UA 06/25/2025 Negative  Negative Final    Bilirubin, POC UA 06/25/2025 Negative  Negative Final    Ketones, POC UA 06/25/2025 Negative  Negative Final    Spec Grav POC UA 06/25/2025 1.015  1.005 - 1.030 Final    Blood, POC UA 06/25/2025 Negative  Negative Final    pH, POC UA 06/25/2025 6.0  5.0 - 8.0 Final    Protein, POC UA 06/25/2025 Negative  Negative Final    Urobilinogen, POC UA 06/25/2025 0.2  <=1.0 Final    Nitrite, POC UA 06/25/2025 Negative  Negative Final    WBC, POC UA 06/25/2025 Negative  Negative Final        No results found for this or any previous visit (from the past 8760 hours).     Assessment:       1. Elevated PSA    2. Neoplasm of uncertain behavior of prostate    3. Pre-op exam        Plan:     Orders Placed This Encounter    X-Ray Chest PA And Lateral    CBC Auto Differential    Comprehensive Metabolic Panel    Ambulatory referral/consult to Pre-Admit    Diet NPO    POCT Urinalysis(Instrument)    IN OFFICE EKG 12-LEAD (to [place])    Case Request Operating Room: BIOPSY,PROSTATE,WITH MRI GUIDANCE        6-13-25  Prostate MRI.  See report.  1.1 cm right peripheral zone apex lesion.  Recommend targeted biopsy.  PIRADS 4.  Prostate volume of approximately 89 cc.  No evidence of lymphadenopathy.      4-10-23  Prostate MRI.  BRG.  See report.  No suspicious prostate lesions identified by MRI.  BPH.  PIRADS 2.  Prostate size is 75.5 cc.  There is no lymphadenopathy.     8-5-21  Prostate MRI.  OLOL.  See report.  No evidence of clinically significant tumor.  PIRADS 2.  Prostate size is 72 cc.     I reviewed all of the above imaging results.            5-5-25  PSA 7.12.  Free % PSA 20.65.     1-7-25  PSA 4.8.  Free % PSA 22.50.     10-9-24  PSA 5.3.   Free % PSA 24.15.     8-6-24  PSA 5.1.  Free % PSA 28.63.     4-4-24  PSA 4.5.  Free % PSA 20.89.     8-15-23  PSA 4.97.  Free % PSA 17.1     5-18-23  PSA 5.68.  Free % PSA 18.3%.     3-6-23  PSA 5.25.  Free % PSA 21.1%.    6-13-25  Cr 1.2.  GFR >60.     1-7-25  Cr 1.1.  GFR >60.    4-9-25  HgbA1c 8     7-10-23  Urine ExoDx 38.67.     I reviewed all of the above lab results.            Assessment:  - Elevated PSA that has increased with observation.  - Prostate MRI on 6-13-25 showed a 1.1 cm right peripheral zone apex lesion.  Recommend targeted biopsy.  PIRADS 4.  Prostate volume of approximately 89 cc.  No evidence of lymphadenopathy.   - Prostate MRI on 4-10-23 showed no suspicious prostate lesions identified by MRI.  BPH.  PIRADS 2.  Prostate size is 75.5 cc.    - Nocturia.  - BPH without obstructive LUTS.  - Trace amount of microscopic hematuria on 1-10-25.  No MH on 5-9-25 or today on 6-25-25.  He is not a smoker and he has no smoking history.  He is not taking a blood thinner.  - ED.  Tadalafil 20 mg occasionally works for his erections.    - DM.  - Family history of bladder cancer in his father.     Plan  - I had a long discussion with the patient and his wife today regarding his abnormal prostate MRI result.  I discussed options with the patient and his wife today and the mutual decision was made to proceed with a MRI ultrasound fusion prostate biopsy.  The patient would like to have this procedure done in operating room with Anesthesia present.  Risks and benefits of the surgery were explained to the patient including the risk of a significant infection and the patient expressed understanding.  All questions were answered by me to the patient's apparent understanding and to the patient's apparent satisfaction.  He was given bowel prep instructions today.  Surgery consent was signed today by the patient.    - I prescribed him sildenafil 100 mg, disp #10, 6 refills to [facility] pharmacy on 5-9-25 with detailed  verbal instructions.  - I discussed dietary modifications with him today and I recommended he drink mostly water during the day.  - I recommended he limit his fluid intake at night to small amounts of water and to void just prior to bedtime.                          [1]   Social History  Socioeconomic History    Marital status:    Tobacco Use    Smoking status: Never    Smokeless tobacco: Never   Substance and Sexual Activity    Alcohol use: Never    Drug use: Never    Sexual activity: Not Currently     Partners: Female     Social Drivers of Health     Financial Resource Strain: Low Risk  (5/28/2025)    Received from [facility]    Overall Financial Resource Strain (CARDIA)     Difficulty of Paying Living Expenses: Not very hard   Food Insecurity: No Food Insecurity (5/28/2025)    Received from [facility]    Hunger Vital Sign     Worried About Running Out of Food in the Last Year: Never true     Ran Out of Food in the Last Year: Never true   Transportation Needs: No Transportation Needs (5/28/2025)    Received from [facility]    PRAPARE - Transportation     Lack of Transportation (Medical): No     Lack of Transportation (Non-Medical): No   Physical Activity: Insufficiently Active (5/28/2025)    Received from [facility]    Exercise Vital Sign     Days of Exercise per Week: 1 day     Minutes of Exercise per Session: 30 min   Stress: No Stress Concern Present (5/28/2025)    Received from [facility]    [employer] - Occupational Stress Questionnaire     Feeling of Stress : Not at all   Housing Stability: Low Risk  (5/28/2025)    Received from [facility]    Housing Stability Vital Sign     Unable to Pay for Housing in the Last Year: No     Number of Times Moved in the Last Year: 0     Homeless in the Last Year: No   [2]   Outpatient  Encounter Medications as of 6/25/2025   Medication Sig Dispense Refill    amLODIPine (NORVASC) 10 MG tablet Take 10 mg by mouth.      losartan (COZAAR) 50 MG tablet Take 50 mg by mouth.      LYCOPENE ORAL Take one every day  Indications: Vitamins      metFORMIN (GLUCOPHAGE-XR) 500 MG ER 24hr tablet Take 500 mg by mouth.      metoprolol tartrate (LOPRESSOR) 50 MG tablet Take 50 mg by mouth.      psyllium (METAMUCIL) powder Dissolve and drink as needed as directed  Indications: Appetite Suppression      sildenafiL (VIAGRA) 100 MG tablet Take 1 tablet (100 mg total) by mouth As instructed for Erectile Dysfunction (Take 1 by mouth as needed 1 hour prior to sexual activity on an empty stomach). 10 tablet 6    simvastatin (ZOCOR) 20 MG tablet Take 20 mg by mouth.      tadalafiL (CIALIS) 20 MG Tab Take 1 tablet (20 mg total) by mouth As instructed (Take either 1/2 tablet or 1 tablet by mouth as needed 2 to 12 hours prior to sexual activity). 30 tablet 6    levothyroxine (SYNTHROID) 88 MCG tablet Take 88 mcg by mouth.       No facility-administered encounter medications on file as of 6/25/2025.

## 2025-06-26 ENCOUNTER — HOSPITAL ENCOUNTER (OUTPATIENT)
Dept: RADIOLOGY | Facility: HOSPITAL | Age: 70
Discharge: HOME | End: 2025-06-26
Attending: UROLOGY
Payer: MEDICARE

## 2025-06-26 ENCOUNTER — HOSPITAL ENCOUNTER (OUTPATIENT)
Dept: CARDIOLOGY | Facility: HOSPITAL | Age: 70
Discharge: HOME | End: 2025-06-26
Attending: UROLOGY
Payer: MEDICARE

## 2025-06-26 DIAGNOSIS — Z01.818 PRE-OP EXAM: ICD-10-CM

## 2025-06-26 LAB
OHS QRS DURATION: 120 MS
OHS QTC CALCULATION: 446 MS

## 2025-06-26 PROCEDURE — 71046 X-RAY EXAM CHEST 2 VIEWS: CPT | Mod: TC

## 2025-06-26 PROCEDURE — 93005 ELECTROCARDIOGRAM TRACING: CPT

## 2025-06-26 PROCEDURE — 93010 ELECTROCARDIOGRAM REPORT: CPT | Mod: ,,, | Performed by: INTERNAL MEDICINE

## 2025-06-26 PROCEDURE — 71046 X-RAY EXAM CHEST 2 VIEWS: CPT | Mod: 26,,, | Performed by: STUDENT IN AN ORGANIZED HEALTH CARE EDUCATION/TRAINING PROGRAM

## 2025-06-29 ENCOUNTER — RESULTS FOLLOW-UP (OUTPATIENT)
Facility: CLINIC | Age: 70
End: 2025-06-29

## 2025-06-30 ENCOUNTER — TELEPHONE (OUTPATIENT)
Dept: UROLOGY | Facility: CLINIC | Age: 70
End: 2025-06-30
Payer: MEDICARE

## 2025-06-30 NOTE — TELEPHONE ENCOUNTER
Spoke to the pt regarding his pre op labs, I explained his hemoglobin and hematocrit were mildly low and Dr. Harding recommends following up with his PCP about this. I confirmed the date, time, and place of his procedure along with the pre op instructions. The pt verbalizes understanding and has no further questions.     ----- Message from Renan Harding MD sent at 6/29/2025  1:24 PM CDT -----  Please call Mr Jorge and let him know that I reviewed his preop labs and his labs overall look good from a surgery standpoint.  His glucose is mildly elevated at 135.  His hemoglobin and hematocrit   are mildly low and he can follow up with his PCP about these mildly abnormal labs.  Make sure he knows the details of his prostate biopsy that is scheduled for Tuesday 7-8-2025 at [facility] at   7:30 am and he will need to be there at 6:00 am to check in.  Explain that sometimes surgery times change and the hospital will call the patient for any surgery time change.  He should not eat or   drink 8 hours prior to surgery.  He needs to stop blood thinners prior to the surgery.  Make sure he knows how to do the bowel prep.  Ask him if he has any questions.   ----- Message -----  From: Lab, Background User  Sent: 6/26/2025   1:55 PM CDT  To: Renan Harding MD

## 2025-07-02 ENCOUNTER — TELEPHONE (OUTPATIENT)
Dept: PREADMISSION TESTING | Facility: HOSPITAL | Age: 70
End: 2025-07-02
Payer: MEDICARE

## 2025-07-07 ENCOUNTER — PATIENT MESSAGE (OUTPATIENT)
Dept: RESPIRATORY THERAPY | Facility: HOSPITAL | Age: 70
End: 2025-07-07
Payer: MEDICARE

## 2025-07-07 ENCOUNTER — ANESTHESIA EVENT (OUTPATIENT)
Dept: SURGERY | Facility: HOSPITAL | Age: 70
End: 2025-07-07
Payer: MEDICARE

## 2025-07-07 NOTE — ANESTHESIA PREPROCEDURE EVALUATION
07/07/2025  Deric Hdez is a 69 y.o., male.      Pre-op Assessment    I have reviewed the Patient Summary Reports.    I have reviewed the NPO Status.   I have reviewed the Medications.     Review of Systems  Anesthesia Hx:               Denies Personal Hx of Anesthesia complications.                    Hematology/Oncology:  Hematology Normal                                     Cardiovascular:     Hypertension           hyperlipidemia                         Hypertension         Pulmonary:  Pulmonary Normal                       Hepatic/GI:      Liver Disease,            Liver Disease        Endocrine:  Diabetes Hypothyroidism   Diabetes                    Obesity / BMI > 30      Physical Exam  General: Well nourished    Airway:  Mallampati: III   Mouth Opening: Normal  TM Distance: 4 - 6 cm  Tongue: Normal  Neck ROM: Normal ROM    Dental:  Intact        Anesthesia Plan  Type of Anesthesia, risks & benefits discussed:    Anesthesia Type: Gen ETT, Gen Supraglottic Airway, Gen Natural Airway, MAC  Intra-op Monitoring Plan: Standard ASA Monitors  Post Op Pain Control Plan: multimodal analgesia and IV/PO Opioids PRN  Induction:  IV  Informed Consent: Informed consent signed with the Patient and all parties understand the risks and agree with anesthesia plan.  All questions answered. Patient consented to blood products? No  ASA Score: 2  Day of Surgery Review of History & Physical: H&P Update referred to the surgeon/provider.    Ready For Surgery From Anesthesia Perspective.     .

## 2025-07-08 ENCOUNTER — HOSPITAL ENCOUNTER (OUTPATIENT)
Facility: HOSPITAL | Age: 70
Discharge: HOME | End: 2025-07-08
Attending: UROLOGY | Admitting: UROLOGY
Payer: MEDICARE

## 2025-07-08 ENCOUNTER — ANESTHESIA (OUTPATIENT)
Dept: SURGERY | Facility: HOSPITAL | Age: 70
End: 2025-07-08
Payer: MEDICARE

## 2025-07-08 VITALS
SYSTOLIC BLOOD PRESSURE: 116 MMHG | HEART RATE: 70 BPM | WEIGHT: 268 LBS | BODY MASS INDEX: 36.3 KG/M2 | HEIGHT: 72 IN | OXYGEN SATURATION: 97 % | TEMPERATURE: 98 F | DIASTOLIC BLOOD PRESSURE: 66 MMHG | RESPIRATION RATE: 20 BRPM

## 2025-07-08 DIAGNOSIS — R97.20 ELEVATED PSA: ICD-10-CM

## 2025-07-08 LAB
POCT GLUCOSE: 118 MG/DL (ref 70–110)
POCT GLUCOSE: 136 MG/DL (ref 70–110)

## 2025-07-08 PROCEDURE — 36000705 HC OR TIME LEV I EA ADD 15 MIN: Performed by: UROLOGY

## 2025-07-08 PROCEDURE — 25000003 PHARM REV CODE 250: Performed by: UROLOGY

## 2025-07-08 PROCEDURE — 63600175 PHARM REV CODE 636 W HCPCS: Performed by: UROLOGY

## 2025-07-08 PROCEDURE — 76942 ECHO GUIDE FOR BIOPSY: CPT | Mod: 26,,, | Performed by: UROLOGY

## 2025-07-08 PROCEDURE — 71000015 HC POSTOP RECOV 1ST HR: Performed by: UROLOGY

## 2025-07-08 PROCEDURE — 36000704 HC OR TIME LEV I 1ST 15 MIN: Performed by: UROLOGY

## 2025-07-08 PROCEDURE — 55700 PR BIOPSY OF PROSTATE,NEEDLE/PUNCH: CPT | Mod: ,,, | Performed by: UROLOGY

## 2025-07-08 PROCEDURE — 82962 GLUCOSE BLOOD TEST: CPT | Performed by: UROLOGY

## 2025-07-08 PROCEDURE — 63600175 PHARM REV CODE 636 W HCPCS: Performed by: NURSE ANESTHETIST, CERTIFIED REGISTERED

## 2025-07-08 PROCEDURE — 37000009 HC ANESTHESIA EA ADD 15 MINS: Performed by: UROLOGY

## 2025-07-08 PROCEDURE — 88344 IMHCHEM/IMCYTCHM EA MLT ANTB: CPT | Mod: TC,91 | Performed by: UROLOGY

## 2025-07-08 PROCEDURE — 37000008 HC ANESTHESIA 1ST 15 MINUTES: Performed by: UROLOGY

## 2025-07-08 PROCEDURE — 71000033 HC RECOVERY, INTIAL HOUR: Performed by: UROLOGY

## 2025-07-08 RX ORDER — PROPOFOL 10 MG/ML
INJECTION, EMULSION INTRAVENOUS
Status: DISCONTINUED | OUTPATIENT
Start: 2025-07-08 | End: 2025-07-08

## 2025-07-08 RX ORDER — CEFTRIAXONE 2 G/1
INJECTION, POWDER, FOR SOLUTION INTRAMUSCULAR; INTRAVENOUS
Status: COMPLETED
Start: 2025-07-08 | End: 2025-07-08

## 2025-07-08 RX ORDER — ONDANSETRON HYDROCHLORIDE 2 MG/ML
4 INJECTION, SOLUTION INTRAVENOUS DAILY PRN
Status: DISCONTINUED | OUTPATIENT
Start: 2025-07-08 | End: 2025-07-08 | Stop reason: HOSPADM

## 2025-07-08 RX ORDER — OXYCODONE AND ACETAMINOPHEN 5; 325 MG/1; MG/1
1 TABLET ORAL
Status: DISCONTINUED | OUTPATIENT
Start: 2025-07-08 | End: 2025-07-08 | Stop reason: HOSPADM

## 2025-07-08 RX ORDER — SODIUM CHLORIDE, SODIUM LACTATE, POTASSIUM CHLORIDE, CALCIUM CHLORIDE 600; 310; 30; 20 MG/100ML; MG/100ML; MG/100ML; MG/100ML
INJECTION, SOLUTION INTRAVENOUS CONTINUOUS PRN
Status: DISCONTINUED | OUTPATIENT
Start: 2025-07-08 | End: 2025-07-08

## 2025-07-08 RX ORDER — MIDAZOLAM HYDROCHLORIDE 1 MG/ML
INJECTION INTRAMUSCULAR; INTRAVENOUS
Status: DISCONTINUED | OUTPATIENT
Start: 2025-07-08 | End: 2025-07-08

## 2025-07-08 RX ORDER — HYDROMORPHONE HYDROCHLORIDE 2 MG/ML
0.2 INJECTION, SOLUTION INTRAMUSCULAR; INTRAVENOUS; SUBCUTANEOUS EVERY 5 MIN PRN
Status: DISCONTINUED | OUTPATIENT
Start: 2025-07-08 | End: 2025-07-08 | Stop reason: HOSPADM

## 2025-07-08 RX ORDER — FENTANYL CITRATE 50 UG/ML
25 INJECTION, SOLUTION INTRAMUSCULAR; INTRAVENOUS EVERY 5 MIN PRN
Status: DISCONTINUED | OUTPATIENT
Start: 2025-07-08 | End: 2025-07-08 | Stop reason: HOSPADM

## 2025-07-08 RX ORDER — LIDOCAINE HYDROCHLORIDE 20 MG/ML
INJECTION INTRAVENOUS
Status: DISCONTINUED | OUTPATIENT
Start: 2025-07-08 | End: 2025-07-08

## 2025-07-08 RX ORDER — CEFTRIAXONE 2 G/1
2 INJECTION, POWDER, FOR SOLUTION INTRAMUSCULAR; INTRAVENOUS
Status: COMPLETED | OUTPATIENT
Start: 2025-07-08 | End: 2025-07-08

## 2025-07-08 RX ORDER — MEPERIDINE HYDROCHLORIDE 25 MG/ML
12.5 INJECTION INTRAMUSCULAR; INTRAVENOUS; SUBCUTANEOUS ONCE AS NEEDED
Status: DISCONTINUED | OUTPATIENT
Start: 2025-07-08 | End: 2025-07-08 | Stop reason: HOSPADM

## 2025-07-08 RX ORDER — GLUCAGON 1 MG
1 KIT INJECTION
Status: DISCONTINUED | OUTPATIENT
Start: 2025-07-08 | End: 2025-07-08 | Stop reason: HOSPADM

## 2025-07-08 RX ORDER — LIDOCAINE HYDROCHLORIDE 10 MG/ML
INJECTION, SOLUTION EPIDURAL; INFILTRATION; INTRACAUDAL; PERINEURAL
Status: DISCONTINUED
Start: 2025-07-08 | End: 2025-07-08 | Stop reason: HOSPADM

## 2025-07-08 RX ORDER — FENTANYL CITRATE 50 UG/ML
INJECTION, SOLUTION INTRAMUSCULAR; INTRAVENOUS
Status: DISCONTINUED | OUTPATIENT
Start: 2025-07-08 | End: 2025-07-08

## 2025-07-08 RX ORDER — CIPROFLOXACIN 500 MG/1
500 TABLET, FILM COATED ORAL 2 TIMES DAILY
Qty: 6 TABLET | Refills: 0 | Status: SHIPPED | OUTPATIENT
Start: 2025-07-08

## 2025-07-08 RX ORDER — LIDOCAINE HYDROCHLORIDE 10 MG/ML
INJECTION, SOLUTION EPIDURAL; INFILTRATION; INTRACAUDAL; PERINEURAL
Status: DISCONTINUED | OUTPATIENT
Start: 2025-07-08 | End: 2025-07-08 | Stop reason: HOSPADM

## 2025-07-08 RX ADMIN — CEFTRIAXONE SODIUM 2 G: 2 INJECTION, POWDER, FOR SOLUTION INTRAMUSCULAR; INTRAVENOUS at 07:07

## 2025-07-08 RX ADMIN — FENTANYL CITRATE 25 MCG: 50 INJECTION, SOLUTION INTRAMUSCULAR; INTRAVENOUS at 07:07

## 2025-07-08 RX ADMIN — PROPOFOL 25 MG: 10 INJECTION, EMULSION INTRAVENOUS at 07:07

## 2025-07-08 RX ADMIN — MIDAZOLAM 2 MG: 1 INJECTION INTRAMUSCULAR; INTRAVENOUS at 07:07

## 2025-07-08 RX ADMIN — GENTAMICIN SULFATE 320 MG: 40 INJECTION, SOLUTION INTRAMUSCULAR; INTRAVENOUS at 07:07

## 2025-07-08 RX ADMIN — SODIUM CHLORIDE, POTASSIUM CHLORIDE, SODIUM LACTATE AND CALCIUM CHLORIDE: 600; 310; 30; 20 INJECTION, SOLUTION INTRAVENOUS at 07:07

## 2025-07-08 RX ADMIN — LIDOCAINE HYDROCHLORIDE 50 MG: 20 INJECTION INTRAVENOUS at 07:07

## 2025-07-08 NOTE — TRANSFER OF CARE
Anesthesia Transfer of Care Note    Patient: Deric Hdez    Procedure(s) Performed: Procedure(s) (LRB):  BIOPSY,PROSTATE,WITH MRI GUIDANCE (N/A)    Patient location: PACU    Anesthesia Type: MAC    Transport from OR: Transported from OR on room air with adequate spontaneous ventilation    Post pain: adequate analgesia    Post assessment: no apparent anesthetic complications and tolerated procedure well    Post vital signs: stable    Level of consciousness: awake and oriented    Nausea/Vomiting: no nausea/vomiting    Complications: none    Transfer of care protocol was followed      Last vitals: Visit Vitals  /62 (BP Location: Left arm, Patient Position: Lying)   Pulse 66   Temp 36.8 °C (98.2 °F) (Temporal)   Resp 13   Ht 6' (1.829 m)   Wt 121.6 kg (267 lb 15.5 oz)   SpO2 96%   BMI 36.34 kg/m²

## 2025-07-08 NOTE — ANESTHESIA POSTPROCEDURE EVALUATION
Anesthesia Post Evaluation    Patient: Deric Hdez    Procedure(s) Performed: Procedure(s) (LRB):  BIOPSY,PROSTATE,WITH MRI GUIDANCE (N/A)    Final Anesthesia Type: general      Patient location during evaluation: PACU  Patient participation: Yes- Able to Participate  Level of consciousness: awake  Post-procedure vital signs: reviewed and stable  Pain management: adequate  Airway patency: patent    PONV status at discharge: No PONV  Anesthetic complications: no      Cardiovascular status: stable  Respiratory status: unassisted  Hydration status: euvolemic  Follow-up not needed.              Vitals Value Taken Time   /66 07/08/25 08:09   Temp 36.8 °C (98.2 °F) 07/08/25 07:55   Pulse 70 07/08/25 08:11   Resp 7 07/08/25 08:11   SpO2 98 % 07/08/25 08:11   Vitals shown include unfiled device data.      Event Time   Out of Recovery 08:09:00         Pain/Kate Score: No data recorded

## 2025-07-08 NOTE — DISCHARGE INSTRUCTIONS
-No lifting more than 10 pounds for at least 2 days.    -Limit activity to walking and mostly sitting for at least 2 days.

## 2025-07-08 NOTE — OP NOTE
Surgeon:  Dr Renan Harding.    Date:  7-8-2025.    Pre operative diagnosis:  1) Elevated PSA.  2) Prostate mass.    Post operative diagnosis:  1) Elevated PSA.  2) Prostate mass.    Surgery:  1) Transrectal Prostate Biopsy.  2) Transrectal ultrasound of prostate with MRI fusion of images.  3) Ultrasound Guidance of Prostate Biopsy needles.    Anesthesia:  General.    Estimated blood loss:  1 ml.    Complications:  None.    Specimens:  Prostate biopsies for permanent specimen.    Procedure in details:  Risks and benefits of the surgery were explained to the patient prior to the surgery and the patient expressed understanding.  All questions were answered by me to the patient's apparent understanding and to the patient's apparent satisfaction.  Surgery consent was signed by the patient prior to the surgery.  The patient confirmed prior to the procedure that he did the bowel prep.  The patient was taken to the operating room and placed in the supine position initially.  Anesthesia was administered by the Anesthesia team.  The patient was then placed in the left lateral decubitus position.  He was secured to the table and all pressure points were padded.  IV antibiotics were given to the patient prior to the surgery.  A timeout was done prior to the surgery.   The ultrasound with rectal probe was lubricated and advanced through his anus into the patient's rectum. The tamycaNav technology was used to perform the patient's MRI ultrasound fusion prostate biopsies.  A sweep of the prostate was performed from the base of the prostate to the apex of the prostate in the transverse plan.  The patient's right sided prostate lesion region of interest was targeted.  All the prostate biopsies were performed with a 18 gauge biopsy needle.  I performed 5 targeted prostate biopsies of his right sided prostate lesion region of interest.  I then performed a systematic 12 core prostate biopsies in a standard fashion obtaining 6 prostate  biopsies on the right and 6 prostate biopsies on the left of the patient's base, mid and apex bilaterally. All the prostate biopsy samples were labeled and I asked the OR nurse to send these prostate biopsy samples to pathology for permanent specimen.  The ultrasound probe was then removed from his body.  The patient was then placed back in the supine position.  A post operative timeout was done at the end of the procedure.  The patient was taken to the recovery room in stable condition at the end of the procedure.

## 2025-07-10 LAB
DHEA SERPL-MCNC: ABNORMAL
ESTROGEN SERPL-MCNC: ABNORMAL PG/ML
INSULIN SERPL-ACNC: ABNORMAL U[IU]/ML
LAB AP CLINICAL INFORMATION: ABNORMAL
LAB AP DIAGNOSIS CATEGORY: ABNORMAL
LAB AP GROSS DESCRIPTION: ABNORMAL
LAB AP PERFORMING LOCATION(S): ABNORMAL
LAB AP REPORT FOOTNOTES: ABNORMAL

## 2025-07-11 ENCOUNTER — TELEPHONE (OUTPATIENT)
Dept: UROLOGY | Facility: CLINIC | Age: 70
End: 2025-07-11
Payer: MEDICARE

## 2025-07-11 NOTE — TELEPHONE ENCOUNTER
Patient scheduled     ----- Message from Renan Harding MD sent at 7/11/2025  8:16 AM CDT -----  Please call this patient and schedule him an appointment to see me next week if possible to discuss his pathology report from his prostate biopsy.

## 2025-07-18 ENCOUNTER — OFFICE VISIT (OUTPATIENT)
Facility: CLINIC | Age: 70
End: 2025-07-18
Payer: MEDICARE

## 2025-07-18 VITALS — SYSTOLIC BLOOD PRESSURE: 173 MMHG | HEART RATE: 86 BPM | DIASTOLIC BLOOD PRESSURE: 79 MMHG | RESPIRATION RATE: 16 BRPM

## 2025-07-18 DIAGNOSIS — N52.8 OTHER MALE ERECTILE DYSFUNCTION: ICD-10-CM

## 2025-07-18 DIAGNOSIS — R97.20 ELEVATED PSA: ICD-10-CM

## 2025-07-18 DIAGNOSIS — C61 PROSTATE CANCER: Primary | ICD-10-CM

## 2025-07-18 PROCEDURE — 99999 PR PBB SHADOW E&M-EST. PATIENT-LVL III: CPT | Mod: PBBFAC,,, | Performed by: UROLOGY

## 2025-07-18 PROCEDURE — 99213 OFFICE O/P EST LOW 20 MIN: CPT | Mod: PBBFAC,PO | Performed by: UROLOGY

## 2025-07-18 NOTE — PROGRESS NOTES
Subjective:       Patient ID: Deric Hdez is a 69 y.o. male.    Chief Complaint: Follow-up      History of Present Illness:     Mr Hdez is here today with his wife.  He has an elevated PSA that has increased with observation.  He underwent a prostate MRI on 6-13-25 that showed a 1.1 cm right peripheral zone apex lesion.  Recommend targeted biopsy.  PIRADS 4.  Prostate volume of approximately 89 cc.      He underwent a MRI ultrasound fusion prostate biopsy by me on 7-8-25.  Path:  Right targeted LEW:  Benign prostate tissue.  Minute focus of prostate adenocarcinoma domingo 6 in the right base and right mid.  High grade PIN in the left apex.    Mr Hdez says that he did not have any problems after his prostate biopsy.  No F/C.  No dysuria.  No current gross hematuria.  Normal urinary flow.  No evidence of lymphadenopathy.  He has ED and tadalafil 20 mg and sildenafil 100 mg are not working for his erections.         Past Medical History:   Diagnosis Date    Diabetes mellitus     Hypertension     Liver disease      Family History   Problem Relation Name Age of Onset    No Known Problems Father      No Known Problems Mother      No Known Problems Maternal Aunt      No Known Problems Maternal Uncle      No Known Problems Paternal Aunt      No Known Problems Paternal Uncle      No Known Problems Paternal Grandmother      No Known Problems Paternal Grandfather      No Known Problems Maternal Grandmother      No Known Problems Maternal Grandfather       Social History[1]  Encounter Medications[2]     Review of Systems   Constitutional:  Negative for chills and fever.   Respiratory:  Negative for shortness of breath.    Cardiovascular:  Negative for chest pain.   Gastrointestinal:  Negative for nausea and vomiting.   Genitourinary:  Negative for difficulty urinating, dysuria and hematuria.   Musculoskeletal:  Negative for back pain.   Skin:  Negative for rash.   Neurological:  Negative for dizziness.    Psychiatric/Behavioral:  Negative for agitation.        Objective:     BP (!) 173/79 (BP Location: Left arm, Patient Position: Sitting)   Pulse 86   Resp 16     Physical Exam  Constitutional:       Appearance: Normal appearance.   Pulmonary:      Effort: Pulmonary effort is normal.   Abdominal:      Palpations: Abdomen is soft.   Neurological:      Mental Status: He is alert and oriented to person, place, and time.   Psychiatric:         Mood and Affect: Mood normal.         No visits with results within 1 Day(s) from this visit.   Latest known visit with results is:   Admission on 07/08/2025, Discharged on 07/08/2025   Component Date Value Ref Range Status    POCT Glucose 07/08/2025 136 (H)  70 - 110 mg/dL Final    Case Report 07/08/2025    Final                    Value:[facility]                              Case: [id number]                                Authorizing Provider:  Renan Harding MD        Collected:           07/08/2025 07:11 AM          [facility]         Received:            07/08/2025 09:39 AM                                 Services                                                                     Pathologist:           Rd Verdin DO                                                     Specimens:   1) - Prostate, [place], Left (LA)                                                                      2) - Prostate, Base, Left (LB)                                                                      3) - Prostate, Mid, Left (LM)                                                                       4) - Prostate, [place], Right (RA)                                                                     5) - Prostate, Base, Right (RB)                                                                                               6) - Prostate, Mid, Right (RM)                                                                      7) - Prostate, TARGET,  "Right target region of interest                                     Clinical Information 07/08/2025    Final                    Value:Procedure:  BIOPSY,PROSTATE,WITH MRI GUIDANCE  Pre-op Diagnosis:  Elevated PSA [R97.20]  Post-op Diagnosis:  R97.20 - Elevated PSA [ICD-10-CM]      Final Diagnosis 07/08/2025    Final                    Value:1. Prostate, left apex, needle core biopsy:  - High-grade prostatic intraepithelial neoplasia (HGPIN)    2. Prostate, left base, needle core biopsy:  - Benign prostatic and seminal vesicle tissue  - Negative for malignancy    3. Prostate, left middle, needle core biopsy:  - Benign prostatic tissue with focal acute inflammation  - Negative for malignancy    4. Prostate, right apex, needle core biopsy:  - Benign prostatic tissue  - Negative for malignancy  - PIN3 is supportive    5. Prostate, right base, needle core biopsy:  - Minute focus of prostatic acinar adenocarcinoma, [place]ha pattern 3+3 = 6 (grade group 1), <1 mm in greatest dimension  - High-grade prostatic intraepithelial neoplasia (HGPIN)    6. Prostate, right middle, needle core biopsy:  - Minute focus of prostatic acinar adenocarcinoma, [place] pattern 3+3 = 6 (grade group 1), <1 mm in greatest dimension    7. Prostate, right, target lesion, needle core biopsy:  - Benign prostatic tissue  - Negative for malignancy  - PIN3 is                           supportive      Comments 07/08/2025    Final                    Value:Parts 5 and 6 of this case were also reviewed with Dr. ANIKET Vidales, [facility], who agrees with the diagnosis.      Gross Description 07/08/2025    Final                    Value:1. Prostate, [place], Left (LA):   MRN # on Epic Label:  [id number]  MRN # on Pathology Label:  [id number]    The specimen is received in formalin labeled "L apex".  The specimen consists of 2 tan-white soft tissue cores measuring 0.1 cm in diameter, and 1.5 cm and 2.0 cm in length.  The specimen is submitted entirely in cassette " "1A.    JAY Marte  Gross Technologist     Grossing was completed by Jennyfer Mitchell on 7/8/2025.    2. Prostate, Base, Left (LB):   MRN # on Epic Label:  [id number]  MRN # on Pathology Label:  [id number]    The specimen is received in formalin labeled "L base".  The specimen consists of 2 tan-white soft tissue cores measuring 0.1 cm in diameter, and both measuring 1.5 cm in length.  The specimen is submitted entirely in cassette 2A.    JAY Marte  Gross Technologist     Grossing was completed by Jennyfer Mitchell on 7/8/2025.    3. Prostate, Mid, Left (LM):   MRN # on Epic Label:  [id number]  MRN # on Pathology Label:  [id number]    The specimen is received in                           formalin labeled "L mid".  The specimen consists of 2 tan-white soft tissue cores measuring 0.1 cm in diameter, and 1.2 cm and 1.5 cm in length.  The specimen is submitted entirely in cassette 3A.    JAY Marte  Gross Technologist     Grossing was completed by Jennyfer Mitchell on 7/8/2025.    4. Prostate, [place], Right (RA):   MRN # on Epic Label:  [id number]  MRN # on Pathology Label:  [id number]    The specimen is received in formalin labeled "R apex".  The specimen consists of 2 tan-white soft tissue cores measuring 0.1 cm in diameter, and 0.9 cm and 1.2 cm in length.  The specimen is submitted entirely in cassette 4A.    JAY Marte  Gross Technologist     Grossing was completed by Jennyfer Mitchell on 7/8/2025.    5. Prostate, Base, Right (RB):   MRN # on Epic Label:  [id number]  MRN # on Pathology Label:  [id number]    The specimen is received in formalin labeled "R base".  The specimen consists of 2 tan-white soft tissue cores measuring 0.1 cm in diameter, and both measuring 1.5                           cm in length.  The specimen is submitted entirely in cassette 5A.    JAY Marte  Gross Technologist     Grossing was completed by Jennyfer Mitchell on 7/8/2025.    6. Prostate, Mid, Right (RM):   MRN # on Epic Label: " " [id number]  MRN # on Pathology Label:  [id number]    The specimen is received in formalin labeled "R mid".  The specimen consists of 2 tan-white soft tissue cores measuring 0.1 cm in diameter, and 1.2 cm and 1.8 cm in length.  The specimen is submitted entirely in cassette 6A.    JAY Marte  Gross Technologist     Grossing was completed by Jennyfer Mitchell on 7/8/2025.    7. Prostate, TARGET: Right target region of interest  MRN # on Epic Label:  [id number]  MRN # on Pathology Label:  [id number]    The specimen is received in formalin labeled "target".  The specimen consists of multiple tan-white soft tissue cores measuring 0.1 cm in diameter, and ranging from 1.0-1.5 cm in length.  The specimen is submitted entirely in cassette 7A.    JAY Marte  Gross                           Technologist     Grossing was completed by Jennyfer Mitchell on 7/8/2025.        Report Footnotes 07/08/2025    Final                    Value:Unless the case is a "gross only" or additional testing only, the final diagnosis for each specimen is based on a microscopic examination of appropriate tissue sections.      Performing Location 07/08/2025    Final                    Value:Grossing performed at [facility], [address] [place] [address], [id number]    Sign out performed at [facility], [address] [place] [address], [id number]      Dx Category 07/08/2025 Malignant/neoplasm (A)    Final    POCT Glucose 07/08/2025 118 (H)  70 - 110 mg/dL Final        No results found for this or any previous visit (from the past 8760 hours).     Assessment:       1. Prostate cancer    2. Elevated PSA    3. Other male erectile dysfunction        Plan:     Orders Placed This Encounter    Prostate Specific Antigen, Diagnostic           6-13-25  Prostate MRI.  See report.  1.1 cm right peripheral zone apex lesion.  Recommend targeted biopsy.  PIRADS 4.  Prostate volume of approximately 89 cc.  No evidence of lymphadenopathy.    "   4-10-23  Prostate MRI.  BRG.  See report.  No suspicious prostate lesions identified by MRI.  BPH.  PIRADS 2.  Prostate size is 75.5 cc.  There is no lymphadenopathy.     8-5-21  Prostate MRI.  OLOL.  See report.  No evidence of clinically significant tumor.  PIRADS 2.  Prostate size is 72 cc.     I reviewed all of the above imaging results.            5-5-25  PSA 7.12.  Free % PSA 20.65.     1-7-25  PSA 4.8.  Free % PSA 22.50.     10-9-24  PSA 5.3.  Free % PSA 24.15.     8-6-24  PSA 5.1.  Free % PSA 28.63.     4-4-24  PSA 4.5.  Free % PSA 20.89.     8-15-23  PSA 4.97.  Free % PSA 17.1     5-18-23  PSA 5.68.  Free % PSA 18.3%.     3-6-23  PSA 5.25.  Free % PSA 21.1%.     6-13-25  Cr 1.2.  GFR >60.     1-7-25  Cr 1.1.  GFR >60.     4-9-25  HgbA1c 8     7-10-23  Urine ExoDx 38.67.     I reviewed all of the above lab results.            Assessment:  - Domingo 6 very low volume prostate cancer diagnosed on 7-8-25.    - S/p MRI ultrasound fusion prostate biopsy on 7-8-25.  Path:  Right targeted LEW:  Benign prostate tissue.  Minute focus of prostate adenocarcinoma domingo 6 in the right base and right mid.  High grade PIN in the left apex.  - Elevated PSA that has increased with observation.  - Prostate MRI on 6-13-25 showed a 1.1 cm right peripheral zone apex lesion.  Recommend targeted biopsy.  PIRADS 4.  Prostate volume of approximately 89 cc.  No evidence of lymphadenopathy.   - BPH without significant LUTS.  - Trace amount of microscopic hematuria on 1-10-25.  No MH on 5-9-25 or on 6-25-25.  He is not a smoker and he has no smoking history.  He is not taking a blood thinner.  - ED.  Tadalafil 20 mg and sildenafil 100 mg are not working for his erections.  - DM.  - Family history of bladder cancer in his father.     Plan  - I had a long discussion with the patient and his wife of his options regarding his newly diagnosed low grade low volume prostate cancer including active surveillance, robotic radical  prostatectomy and robotic bilateral pelvic lymphadenectomy, and various types of radiation treatment options such as brachytherapy and external beam radiation therapy.  Risks and benefits of a robotic radical prostatectomy and robotic bilateral pelvic lymphadenectomy were explained to the patient and the patient expressed understanding.  I discussed multiple risks regarding this surgery including but not limited to the risk of urinary incontinence and I explained that the urinary incontinence could be permanent.  The risk of erectile dysfunction and I explained that the erectile dysfunction could be permanent.  The risks of injury to surrounding organs and structures including an injury to the rectum, risks of bleeding possibly requiring a blood transfusion, and the risk of infection.  I answered all of his questions to his apparent understanding and apparent satisfaction.  The mutual decision was made for the patent to proceed with active surveillance at this time with a repeat PSA in 3 months.   - He is not interested in any other ED treatments at this time.  - I discussed dietary modifications with him today and I recommended he drink mostly water during the day.  - I recommended he limit his fluid intake at night to small amounts of water and to void just prior to bedtime.   - PSA in 3 months a few days prior to a 3 month appointment.  - RTC in 3 month or sooner as needed.                       [1]   Social History  Socioeconomic History    Marital status:    Tobacco Use    Smoking status: Never    Smokeless tobacco: Never   Substance and Sexual Activity    Alcohol use: Never    Drug use: Never    Sexual activity: Not Currently     Partners: Female     Social Drivers of Health     Financial Resource Strain: Low Risk  (5/28/2025)    Received from [facility]    Overall Financial Resource Strain (CARDIA)     Difficulty of Paying Living Expenses: Not very hard   Food Insecurity: No  Food Insecurity (5/28/2025)    Received from [facility]    Hunger Vital Sign     Worried About Running Out of Food in the Last Year: Never true     Ran Out of Food in the Last Year: Never true   Transportation Needs: No Transportation Needs (5/28/2025)    Received from [facility]    PRAPARE - Transportation     Lack of Transportation (Medical): No     Lack of Transportation (Non-Medical): No   Physical Activity: Insufficiently Active (5/28/2025)    Received from [facility]    Exercise Vital Sign     Days of Exercise per Week: 1 day     Minutes of Exercise per Session: 30 min   Stress: No Stress Concern Present (5/28/2025)    Received from [facility]    [employer] [place] [employer] - Occupational Stress Questionnaire     Feeling of Stress : Not at all   Housing Stability: Low Risk  (5/28/2025)    Received from [facility]    Housing Stability Vital Sign     Unable to Pay for Housing in the Last Year: No     Number of Times Moved in the Last Year: 0     Homeless in the Last Year: No   [2]   Outpatient Encounter Medications as of 7/18/2025   Medication Sig Dispense Refill    amLODIPine (NORVASC) 10 MG tablet Take 10 mg by mouth.      ciprofloxacin HCl (CIPRO) 500 MG tablet Take 1 tablet (500 mg total) by mouth 2 (two) times daily. 6 tablet 0    losartan (COZAAR) 50 MG tablet Take 50 mg by mouth.      LYCOPENE ORAL Take one every day  Indications: Vitamins      metFORMIN (GLUCOPHAGE-XR) 500 MG ER 24hr tablet Take 500 mg by mouth.      metoprolol tartrate (LOPRESSOR) 50 MG tablet Take 50 mg by mouth.      psyllium (METAMUCIL) powder Dissolve and drink as needed as directed  Indications: Appetite Suppression      sildenafiL (VIAGRA) 100 MG tablet Take 1 tablet (100 mg total) by mouth As instructed for Erectile Dysfunction (Take 1 by mouth as needed 1 hour prior to sexual activity on an  empty stomach). 10 tablet 6    simvastatin (ZOCOR) 20 MG tablet Take 20 mg by mouth.      tadalafiL (CIALIS) 20 MG Tab Take 1 tablet (20 mg total) by mouth As instructed (Take either 1/2 tablet or 1 tablet by mouth as needed 2 to 12 hours prior to sexual activity). 30 tablet 6    levothyroxine (SYNTHROID) 88 MCG tablet Take 88 mcg by mouth.       No facility-administered encounter medications on file as of 7/18/2025.

## (undated) DEVICE — JELLY LUBRICATING STERILE 2OZ

## (undated) DEVICE — DRESSING TELFA N ADH 3X8

## (undated) DEVICE — MARKER SKIN RULER STERILE

## (undated) DEVICE — CONTAINER FORMALIN PREFILLED

## (undated) DEVICE — COVER PROBE ULTRASOUND 6X48IN

## (undated) DEVICE — TOWEL OR DISP STRL BLUE 4/PK

## (undated) DEVICE — NDL MAXCORE BIOPSY 18G 25CM

## (undated) DEVICE — GOWN POLY REINF BRTH SLV XL

## (undated) DEVICE — GAUZE WOVEN STRL 12-PLY 4X4IN

## (undated) DEVICE — KIT TURNOVER

## (undated) DEVICE — GLOVE SURGICAL LATEX SZ 7

## (undated) DEVICE — SYR LUER LOCK STERILE 10ML

## (undated) DEVICE — GUIDE BIOPSY BIPLANAR 18G